# Patient Record
Sex: MALE | Race: WHITE | NOT HISPANIC OR LATINO | Employment: FULL TIME | URBAN - METROPOLITAN AREA
[De-identification: names, ages, dates, MRNs, and addresses within clinical notes are randomized per-mention and may not be internally consistent; named-entity substitution may affect disease eponyms.]

---

## 2017-12-21 ENCOUNTER — LAB VISIT (OUTPATIENT)
Dept: LAB | Facility: HOSPITAL | Age: 42
End: 2017-12-21
Attending: FAMILY MEDICINE
Payer: COMMERCIAL

## 2017-12-21 ENCOUNTER — DOCUMENTATION ONLY (OUTPATIENT)
Dept: FAMILY MEDICINE | Facility: CLINIC | Age: 42
End: 2017-12-21

## 2017-12-21 ENCOUNTER — OFFICE VISIT (OUTPATIENT)
Dept: FAMILY MEDICINE | Facility: CLINIC | Age: 42
End: 2017-12-21
Payer: COMMERCIAL

## 2017-12-21 VITALS
WEIGHT: 255.75 LBS | HEIGHT: 71 IN | BODY MASS INDEX: 35.81 KG/M2 | DIASTOLIC BLOOD PRESSURE: 76 MMHG | SYSTOLIC BLOOD PRESSURE: 122 MMHG

## 2017-12-21 DIAGNOSIS — Z00.00 WELLNESS EXAMINATION: ICD-10-CM

## 2017-12-21 DIAGNOSIS — F41.9 ANXIETY: ICD-10-CM

## 2017-12-21 DIAGNOSIS — Z72.0 TOBACCO USE: ICD-10-CM

## 2017-12-21 DIAGNOSIS — I10 BENIGN ESSENTIAL HTN: ICD-10-CM

## 2017-12-21 DIAGNOSIS — F11.21 OPIOID DEPENDENCE IN REMISSION: ICD-10-CM

## 2017-12-21 DIAGNOSIS — Z76.89 ESTABLISHING CARE WITH NEW DOCTOR, ENCOUNTER FOR: Primary | ICD-10-CM

## 2017-12-21 LAB
ALBUMIN SERPL BCP-MCNC: 4 G/DL
ALP SERPL-CCNC: 67 U/L
ALT SERPL W/O P-5'-P-CCNC: 27 U/L
ANION GAP SERPL CALC-SCNC: 7 MMOL/L
AST SERPL-CCNC: 27 U/L
BILIRUB SERPL-MCNC: 0.3 MG/DL
BUN SERPL-MCNC: 9 MG/DL
CALCIUM SERPL-MCNC: 9.4 MG/DL
CHLORIDE SERPL-SCNC: 102 MMOL/L
CO2 SERPL-SCNC: 30 MMOL/L
CREAT SERPL-MCNC: 1.1 MG/DL
EST. GFR  (AFRICAN AMERICAN): >60 ML/MIN/1.73 M^2
EST. GFR  (NON AFRICAN AMERICAN): >60 ML/MIN/1.73 M^2
GLUCOSE SERPL-MCNC: 80 MG/DL
POTASSIUM SERPL-SCNC: 3.9 MMOL/L
PROT SERPL-MCNC: 7.3 G/DL
SODIUM SERPL-SCNC: 139 MMOL/L
TSH SERPL DL<=0.005 MIU/L-ACNC: 1.79 UIU/ML

## 2017-12-21 PROCEDURE — 80053 COMPREHEN METABOLIC PANEL: CPT

## 2017-12-21 PROCEDURE — 99386 PREV VISIT NEW AGE 40-64: CPT | Mod: S$GLB,,, | Performed by: FAMILY MEDICINE

## 2017-12-21 PROCEDURE — 84443 ASSAY THYROID STIM HORMONE: CPT

## 2017-12-21 PROCEDURE — 36415 COLL VENOUS BLD VENIPUNCTURE: CPT | Mod: PO

## 2017-12-21 PROCEDURE — 99999 PR PBB SHADOW E&M-NEW PATIENT-LVL II: CPT | Mod: PBBFAC,,, | Performed by: FAMILY MEDICINE

## 2017-12-21 RX ORDER — CLONAZEPAM 1 MG/1
TABLET ORAL
COMMUNITY
Start: 2017-09-19 | End: 2017-12-21

## 2017-12-21 RX ORDER — DIAZEPAM 5 MG/1
5 TABLET ORAL EVERY 6 HOURS PRN
Qty: 270 TABLET | Refills: 0 | Status: SHIPPED | OUTPATIENT
Start: 2017-12-21 | End: 2017-12-27 | Stop reason: SDUPTHER

## 2017-12-21 RX ORDER — LISINOPRIL 20 MG/1
20 TABLET ORAL DAILY
COMMUNITY

## 2017-12-21 RX ORDER — BUPRENORPHINE HYDROCHLORIDE, NALOXONE HYDROCHLORIDE 8; 2 MG/1; MG/1
FILM, SOLUBLE BUCCAL; SUBLINGUAL
COMMUNITY
Start: 2017-12-02

## 2017-12-21 NOTE — PROGRESS NOTES
Subjective:       Patient ID: Leroy Pham is a 41 y.o. male.    Chief Complaint: Establish Care    HPI     Establish Care  Pt reports to the clinic to establish care.   The pt has a medical history which includes anxiety and HTN.  As far as smoking is concerned, the pt is in the process of quitting cigs.   The pt attempts to maintain a healthy diet and engages in regular exercise.   Consistent seatbelt usage reported.   Pt has no symptoms of depression.   Health maintenance addressed and updated in chart.    Review of Systems   Constitutional: Negative for chills and fever.   Respiratory: Negative for chest tightness and shortness of breath.    Cardiovascular: Negative for chest pain and leg swelling.   Gastrointestinal: Negative for abdominal pain, constipation, diarrhea and vomiting.   Genitourinary: Negative for decreased urine volume, dysuria and hematuria.   Musculoskeletal: Positive for arthralgias and back pain.   Neurological: Negative for weakness and light-headedness.   Psychiatric/Behavioral: Negative for behavioral problems. The patient is nervous/anxious.        Objective:      Physical Exam   Constitutional: He appears well-developed and well-nourished. No distress.   Obese male in no acute distress.   HENT:   Head: Normocephalic and atraumatic.   Mouth/Throat: Oropharynx is clear and moist. No oropharyngeal exudate.   Eyes: EOM are normal. Pupils are equal, round, and reactive to light.   Neck: Normal range of motion. Neck supple. No thyromegaly present.   Cardiovascular: Normal rate, regular rhythm, normal heart sounds and intact distal pulses.    Pulmonary/Chest: Effort normal and breath sounds normal. No respiratory distress. He has no wheezes.   Abdominal: Soft. Bowel sounds are normal. He exhibits no distension and no mass. There is no tenderness.   Musculoskeletal: He exhibits no edema.   Lymphadenopathy:     He has no cervical adenopathy.   Neurological: He is alert.   Skin: Skin is  warm. No erythema.   Psychiatric: He has a normal mood and affect. His behavior is normal.   Vitals reviewed.      Assessment:       1. Establishing care with new doctor, encounter for    2. Wellness examination    3. Benign essential HTN    4. Anxiety    5. Opioid dependence in remission    6. Tobacco use        Plan:       1. Establishing care with new doctor, encounter for    2. Wellness examination  Patient has been advised to continue to maintain a healthy lifestyle, including regular exercise and consuming a well balanced diet.   - Comprehensive metabolic panel; Future  - Lipid panel; Future  - Microalbumin/creatinine urine ratio; Future    3. Benign essential HTN  Condition currently stable. No changes to medication regimen on today.   - lisinopril (PRINIVIL,ZESTRIL) 20 MG tablet; Take 20 mg by mouth once daily.  - Comprehensive metabolic panel; Future  - Microalbumin/creatinine urine ratio; Future    4. Anxiety  - TSH; Future  - Toxicology screen, urine; Future  - diazePAM (VALIUM) 5 MG tablet; Take 1 tablet (5 mg total) by mouth every 6 (six) hours as needed for Anxiety.  Dispense: 270 tablet; Refill: 0    5. Opioid dependence in remission  F/u Dr. Marshall with [facility].   - SUBOXONE 8-2 mg Film;     6. Tobacco use  Pt is in the process of smoke cessation.   He does not desire therapy as adjunct at this time.     Patient readiness: acceptance and barriers:none    During the course of the visit the patient was educated and counseled about the following:     Hypertension:   Dietary sodium restriction.  Regular aerobic exercise.  Obesity:   Informal exercise measures discussed, e.g. taking stairs instead of elevator.  Regular aerobic exercise program discussed.    Goals: Hypertension: Reduce Blood Pressure and Obesity: Reduce calorie intake and BMI    Did patient meet goals/outcomes: No    The following self management tools provided: blood pressure log  excercise log    Patient Instructions  (the written plan) was given to the patient/family.     Time spent with patient: 15 minutes    Portions of this note were created using Dragon voice recognition software. There may be voice recognition errors found in the text, and attempts were made to correct these errors prior to signature    Qamar Pang MD    Family Medicine  12/21/2017

## 2017-12-21 NOTE — PROGRESS NOTES
Pre-Visit Chart Review  For Appointment Scheduled on (12/21    Health Maintenance Due   Topic Date Due    Lipid Panel  1974    TETANUS VACCINE  12/24/1992    Influenza Vaccine  08/01/2017

## 2017-12-26 ENCOUNTER — TELEPHONE (OUTPATIENT)
Dept: FAMILY MEDICINE | Facility: CLINIC | Age: 42
End: 2017-12-26

## 2017-12-26 DIAGNOSIS — F41.9 ANXIETY: ICD-10-CM

## 2017-12-26 NOTE — TELEPHONE ENCOUNTER
Spoke with patient. Pt. Established care 12/21/17. Prescribed valium 5mg. Pt. States medication isnt working if Rx can be increased to 10mg.    Please Advise:    Informed pt. You will return to office 12/28.

## 2017-12-26 NOTE — TELEPHONE ENCOUNTER
----- Message from Thi Rocha sent at 12/26/2017 12:02 PM CST -----  Contact: self   Patient want to know if you can call in 10mg diazePAM the 5mg not working for patient please send to [facility], any questions please call back at [phone] (home)     [facility]/pharmacy #[id number] - [place] - [address]  [address] [place] [address]  Phone: [phone] Fax: [phone]

## 2017-12-27 ENCOUNTER — TELEPHONE (OUTPATIENT)
Dept: FAMILY MEDICINE | Facility: CLINIC | Age: 42
End: 2017-12-27

## 2017-12-27 RX ORDER — DIAZEPAM 10 MG/1
10 TABLET ORAL EVERY 6 HOURS PRN
Qty: 60 TABLET | Refills: 0 | Status: SHIPPED | OUTPATIENT
Start: 2017-12-27 | End: 2018-01-08 | Stop reason: SDUPTHER

## 2017-12-27 NOTE — TELEPHONE ENCOUNTER
----- Message from Michelle Torres sent at 12/27/2017  3:36 PM CST -----  Contact: patient  Place call to pod,Patient returning call.please call back at [phone]. Thanks,

## 2017-12-30 ENCOUNTER — LAB VISIT (OUTPATIENT)
Dept: LAB | Facility: HOSPITAL | Age: 42
End: 2017-12-30
Attending: FAMILY MEDICINE
Payer: COMMERCIAL

## 2017-12-30 DIAGNOSIS — Z00.00 WELLNESS EXAMINATION: ICD-10-CM

## 2017-12-30 LAB
CHOLEST SERPL-MCNC: 239 MG/DL
CHOLEST/HDLC SERPL: 6.1 {RATIO}
HDLC SERPL-MCNC: 39 MG/DL
HDLC SERPL: 16.3 %
LDLC SERPL CALC-MCNC: 140.4 MG/DL
NONHDLC SERPL-MCNC: 200 MG/DL
TRIGL SERPL-MCNC: 298 MG/DL

## 2017-12-30 PROCEDURE — 80061 LIPID PANEL: CPT

## 2017-12-30 PROCEDURE — 36415 COLL VENOUS BLD VENIPUNCTURE: CPT | Mod: PO

## 2018-01-02 PROBLEM — E78.5 HYPERLIPIDEMIA: Status: ACTIVE | Noted: 2018-01-02

## 2018-01-05 DIAGNOSIS — E78.5 HYPERLIPIDEMIA, UNSPECIFIED HYPERLIPIDEMIA TYPE: Primary | ICD-10-CM

## 2018-01-05 RX ORDER — ATORVASTATIN CALCIUM 40 MG/1
40 TABLET, FILM COATED ORAL DAILY
Qty: 90 TABLET | Refills: 3 | Status: SHIPPED | OUTPATIENT
Start: 2018-01-05 | End: 2019-01-05

## 2018-01-06 ENCOUNTER — NURSE TRIAGE (OUTPATIENT)
Dept: ADMINISTRATIVE | Facility: CLINIC | Age: 43
End: 2018-01-06

## 2018-01-06 NOTE — TELEPHONE ENCOUNTER
Left message on voicemail    Reason for Disposition   Message left on identified answering machine.    Protocols used: ST NO CONTACT OR DUPLICATE CONTACT CALL-A-AH

## 2018-01-06 NOTE — TELEPHONE ENCOUNTER
"Patient stated that he needs a medication refill for Valium. Informed the controlled substances can't be filled after hours per protocol and he verbalized understanding. Will send a message to PCP's office for reopening on Monday. Instructed to call back with any additional questions and/or concerns    Reason for Disposition   Caller has medication question only, adult not sick, and triager answers question    Answer Assessment - Initial Assessment Questions  1. REASON FOR CALL or QUESTION: "What is your reason for calling today?" or "How can I best help you?" or "What question do you have that I can help answer?"      Medication refill    Protocols used: ST MEDICATION QUESTION CALL-A-AH, ST INFORMATION ONLY CALL-A-      "

## 2018-01-08 DIAGNOSIS — F41.9 ANXIETY: ICD-10-CM

## 2018-01-08 RX ORDER — DIAZEPAM 10 MG/1
10 TABLET ORAL EVERY 6 HOURS PRN
Qty: 60 TABLET | Refills: 0 | Status: SHIPPED | OUTPATIENT
Start: 2018-01-08 | End: 2018-01-11 | Stop reason: SDUPTHER

## 2018-01-08 RX ORDER — HYDROCODONE BITARTRATE AND ACETAMINOPHEN 5; 325 MG/1; MG/1
1 TABLET ORAL
Refills: 0 | COMMUNITY
Start: 2017-12-23

## 2018-01-09 ENCOUNTER — TELEPHONE (OUTPATIENT)
Dept: FAMILY MEDICINE | Facility: CLINIC | Age: 43
End: 2018-01-09

## 2018-01-09 NOTE — TELEPHONE ENCOUNTER
"----- Message from Charisse Hay sent at 1/8/2018  4:38 PM CST -----  Contact: Patient  Leroy, patient [phone] calling because he is having issues refilling his prescription at the pharmacy for diazePAM (VALIUM) 10 MG Tab, patient states that he was taking diazePAM 5 Mg, and it was not helping, so it was changed to 10 MG and pharmacy told patient that office needs to call to "override something". If possible, he would like it sent to [facility]. Patient almost out of medication. Please advise. Thanks.    [facility]/pharmacy #[id number] [address] [place] [address]  Phone: [phone] Fax: [phone]      [facility]/pharmacy #[id number] - [place] - [address]  [address] [address]  Phone: [phone] Fax: [phone]    [facility]  [address] [address]  Phone: [phone] Fax: [phone]    "

## 2018-01-09 NOTE — TELEPHONE ENCOUNTER
----- Message from RT Kulwant sent at 1/9/2018  1:35 PM CST -----  Contact: pt    pt , requesting to check the status of medication refill: Diazepam and medication for cholesterol,  thanks.

## 2018-01-09 NOTE — TELEPHONE ENCOUNTER
----- Message from Giana Lomas sent at 1/9/2018  3:22 PM CST -----  Contact: sawyer with [facility] [phone]  sawyer with [facility] [phone], requesting a call from nurse to clarify diazepam.

## 2018-01-10 ENCOUNTER — TELEPHONE (OUTPATIENT)
Dept: FAMILY MEDICINE | Facility: CLINIC | Age: 43
End: 2018-01-10

## 2018-01-10 NOTE — TELEPHONE ENCOUNTER
----- Message from Kristen Godoy sent at 1/10/2018  9:29 AM CST -----  Patient states he wants to speak to , only, about his medication, that is all the information he would give, [phone] (home)

## 2018-01-10 NOTE — TELEPHONE ENCOUNTER
Rx for diazepam has been cancelled per Dr. Pang request. Patient has scheduled an appointment with PCP. Pharmacy notified. Patient notified.

## 2018-01-10 NOTE — TELEPHONE ENCOUNTER
----- Message from Ann House sent at 1/10/2018  8:28 AM CST -----  Contact:  Liam with [facility]   Liam with [facility] pharmacy [phone] called regarding diazepam for above patient. They are requesting one of the following: they received a too soon to fill due to a dosage increase.  Patient is giving them a hard time and Liam would like to speak to someone regarding this issue. Thanks!

## 2018-01-10 NOTE — TELEPHONE ENCOUNTER
LM for patient stating Dr. Pang is out of office on Wednesdays. Will inform PCP patient would like a telephone call.

## 2018-01-10 NOTE — TELEPHONE ENCOUNTER
----- Message from Mal Rubio sent at 1/10/2018 10:46 AM CST -----  Contact: self   Patient want to speak with a nurse regarding his rx diazePAM (VALIUM) 10 MG Tab, please call back at [phone] (home) patient needs a refill,  pharmacy tried calling this morning but not response.

## 2018-01-10 NOTE — TELEPHONE ENCOUNTER
----- Message from [place]ya Lomas sent at 1/10/2018  9:08 AM CST -----  Contact: [phone]  Patient requesting a refill on diazepam.    Patient will be using   [facility], - [place] - [address]  [address] [address]  Phone: [phone] Fax: [phone]    Please call patient at [phone].     Thanks!

## 2018-01-10 NOTE — TELEPHONE ENCOUNTER
----- Message from Ann House sent at 1/10/2018 11:18 AM CST -----  Contact: self  Patient would like to speak to the nurse regarding his prescription of diazepam. Patient states the pharmacy said it was no problem to fill the prescription if the doctor approved it. Patient states the nurse denied prescription until he is seen tomorrow. Patient only made the appointment because he could not get a response from the pharmacy. Please call patient at [phone]. Thanks! Tried to contact Karma

## 2018-01-10 NOTE — TELEPHONE ENCOUNTER
----- Message from Mal Rubio sent at 1/9/2018  4:52 PM CST -----  Contact: Bill with [facility]   [facility] need to speak with a nurse regarding rx  diazePAM (VALIUM) 10 MG Tab, please call back at [phone]

## 2018-01-11 ENCOUNTER — OFFICE VISIT (OUTPATIENT)
Dept: FAMILY MEDICINE | Facility: CLINIC | Age: 43
End: 2018-01-11
Payer: COMMERCIAL

## 2018-01-11 ENCOUNTER — DOCUMENTATION ONLY (OUTPATIENT)
Dept: FAMILY MEDICINE | Facility: CLINIC | Age: 43
End: 2018-01-11

## 2018-01-11 VITALS
BODY MASS INDEX: 35.71 KG/M2 | HEIGHT: 71 IN | WEIGHT: 255.06 LBS | DIASTOLIC BLOOD PRESSURE: 62 MMHG | SYSTOLIC BLOOD PRESSURE: 118 MMHG

## 2018-01-11 DIAGNOSIS — F41.9 ANXIETY: Primary | ICD-10-CM

## 2018-01-11 DIAGNOSIS — I10 BENIGN ESSENTIAL HTN: ICD-10-CM

## 2018-01-11 DIAGNOSIS — F11.21 OPIOID DEPENDENCE IN REMISSION: ICD-10-CM

## 2018-01-11 PROCEDURE — 99999 PR PBB SHADOW E&M-EST. PATIENT-LVL III: CPT | Mod: PBBFAC,,, | Performed by: FAMILY MEDICINE

## 2018-01-11 PROCEDURE — 99213 OFFICE O/P EST LOW 20 MIN: CPT | Mod: S$GLB,,, | Performed by: FAMILY MEDICINE

## 2018-01-11 RX ORDER — DIAZEPAM 10 MG/1
10 TABLET ORAL EVERY 6 HOURS PRN
Qty: 20 TABLET | Refills: 0 | Status: SHIPPED | OUTPATIENT
Start: 2018-01-11 | End: 2018-01-20 | Stop reason: SDUPTHER

## 2018-01-11 NOTE — PROGRESS NOTES
Pre-Visit Chart Review  For Appointment Scheduled on (1/11/18    Health Maintenance Due   Topic Date Due    TETANUS VACCINE  12/24/1992    Pneumococcal PPSV23 (Medium Risk) (1) 12/24/1992    Influenza Vaccine  08/01/2017

## 2018-01-11 NOTE — PROGRESS NOTES
Subjective:       Patient ID: Leroy Pham is a 42 y.o. male.    Chief Complaint: Medication review    HPI     Anxiety  Patient is here to follow-up for anxiety.  He is currently taking Valium for this condition.  The patient has had issues in the past with dose adjustments and states that when he was on the 5 mg dosage of this medication, he did not see much improvement in his symptoms.  Patient has recently been increased to 10 mg dosage on this medication and states that this dosage is effective.  He does report that he is taking his Valium every 6 hours, no matter if his symptoms are present or not.  Patient is not currently being evaluated by psychiatry for this condition.    Review of Systems   Constitutional: Negative for chills and fever.   Respiratory: Negative for chest tightness and shortness of breath.    Cardiovascular: Negative for chest pain and leg swelling.   Gastrointestinal: Negative for abdominal pain, constipation, diarrhea and vomiting.   Genitourinary: Negative for decreased urine volume, dysuria and hematuria.   Musculoskeletal: Negative for arthralgias.   Neurological: Negative for weakness and light-headedness.   Psychiatric/Behavioral: Negative for self-injury. The patient is nervous/anxious.        Objective:      Physical Exam   Constitutional: He appears well-developed and well-nourished. No distress.   Obese male in no acute distress.   HENT:   Head: Normocephalic and atraumatic.   Mouth/Throat: Oropharynx is clear and moist. No oropharyngeal exudate.   Eyes: EOM are normal. Pupils are equal, round, and reactive to light.   Neck: Normal range of motion. Neck supple. No thyromegaly present.   Cardiovascular: Normal rate, regular rhythm, normal heart sounds and intact distal pulses.    Pulmonary/Chest: Effort normal and breath sounds normal. No respiratory distress. He has no wheezes.   Abdominal: Soft. Bowel sounds are normal. He exhibits no distension and no mass. There is no  tenderness.   Musculoskeletal: He exhibits no edema.   Lymphadenopathy:     He has no cervical adenopathy.   Neurological: He is alert.   Skin: Skin is warm. No rash noted. No erythema.   Psychiatric: He has a normal mood and affect. His behavior is normal.   Vitals reviewed.      Assessment:       1. Anxiety    2. Opioid dependence in remission    3. Benign essential HTN        Plan:       1. Anxiety  Patient has been advised that he will only need to take this medication, as needed.  The patient will be referred to psychiatry for further evaluation and management of this condition.  He has violated pain contract with asking for refills on medications prior to scheduled refill but he feels that it was due to his misunderstanding that he should not be taking the Valium every 6 hours.  - diazePAM (VALIUM) 10 MG Tab; Take 1 tablet (10 mg total) by mouth every 6 (six) hours as needed.  Dispense: 20 tablet; Refill: 0  - Ambulatory referral to Psychiatry    2. Opioid dependence in remission  Patient will not be prescribed opioid medications on today.  - Ambulatory referral to Psychiatry    3. Benign essential HTN  Condition currently stable. No changes to medication regimen on today.     Patient readiness: acceptance and barriers:none    During the course of the visit the patient was educated and counseled about the following:     Hypertension:   Dietary sodium restriction.  Regular aerobic exercise.  Obesity:   Informal exercise measures discussed, e.g. taking stairs instead of elevator.  Regular aerobic exercise program discussed.    Goals: Hypertension: Reduce Blood Pressure and Obesity: Reduce calorie intake and BMI    Did patient meet goals/outcomes: No    The following self management tools provided: blood pressure log  excercise log    Patient Instructions (the written plan) was given to the patient/family.     Time spent with patient: 15 minutes    Portions of this note were created using Dragon voice recognition  software. There may be voice recognition errors found in the text, and attempts were made to correct these errors prior to signature    Qamar Pang MD    Family Medicine  1/11/2018

## 2018-01-20 ENCOUNTER — OFFICE VISIT (OUTPATIENT)
Dept: FAMILY MEDICINE | Facility: CLINIC | Age: 43
End: 2018-01-20
Payer: COMMERCIAL

## 2018-01-20 VITALS
SYSTOLIC BLOOD PRESSURE: 130 MMHG | BODY MASS INDEX: 35.62 KG/M2 | HEIGHT: 71 IN | DIASTOLIC BLOOD PRESSURE: 76 MMHG | WEIGHT: 254.44 LBS

## 2018-01-20 DIAGNOSIS — I10 BENIGN ESSENTIAL HTN: ICD-10-CM

## 2018-01-20 DIAGNOSIS — F41.9 ANXIETY: Primary | ICD-10-CM

## 2018-01-20 DIAGNOSIS — Z72.0 TOBACCO USE: ICD-10-CM

## 2018-01-20 DIAGNOSIS — E78.5 HYPERLIPIDEMIA, UNSPECIFIED HYPERLIPIDEMIA TYPE: ICD-10-CM

## 2018-01-20 PROCEDURE — 99213 OFFICE O/P EST LOW 20 MIN: CPT | Mod: S$GLB,,, | Performed by: FAMILY MEDICINE

## 2018-01-20 PROCEDURE — 99999 PR PBB SHADOW E&M-EST. PATIENT-LVL III: CPT | Mod: PBBFAC,,, | Performed by: FAMILY MEDICINE

## 2018-01-20 RX ORDER — DIAZEPAM 10 MG/1
10 TABLET ORAL EVERY 6 HOURS PRN
Qty: 20 TABLET | Refills: 0 | Status: SHIPPED | OUTPATIENT
Start: 2018-01-20 | End: 2018-01-29 | Stop reason: SDUPTHER

## 2018-01-20 NOTE — PATIENT INSTRUCTIONS

## 2018-01-20 NOTE — PROGRESS NOTES
Subjective:       Patient ID: Leroy Pham is a 42 y.o. male.    Chief Complaint: Follow-up    HPI     Follow up  Pt is here to follow up multiple chronic medical conditions.   Pt does reports compliance with medications.   The pt has a current PMH of HLD and HTN.   As it relates to exercise, the pt reports that he has not been routinely exercising.   As far as smoking is concerned, the pt smokes.   Pt has been making attempts at eating healthy.  Health maintenance addressed and updated in chart.    Review of Systems   Constitutional: Negative for chills and fever.   Respiratory: Negative for chest tightness and shortness of breath.    Cardiovascular: Negative for chest pain and leg swelling.   Gastrointestinal: Negative for abdominal pain, constipation, diarrhea and vomiting.   Genitourinary: Negative for decreased urine volume, dysuria and hematuria.   Musculoskeletal: Negative for arthralgias and back pain.   Neurological: Negative for weakness and light-headedness.       Objective:      Physical Exam   Constitutional: He appears well-developed and well-nourished. No distress.   Obese male in no acute distress.     HENT:   Head: Normocephalic and atraumatic.   Mouth/Throat: Oropharynx is clear and moist. No oropharyngeal exudate.   Eyes: EOM are normal. Pupils are equal, round, and reactive to light.   Neck: Normal range of motion. Neck supple. No thyromegaly present.   Cardiovascular: Normal rate, regular rhythm, normal heart sounds and intact distal pulses.    Pulmonary/Chest: Effort normal and breath sounds normal. No respiratory distress. He has no wheezes.   Abdominal: Soft. Bowel sounds are normal. He exhibits no distension and no mass. There is no tenderness.   Musculoskeletal: He exhibits no edema.   Lymphadenopathy:     He has no cervical adenopathy.   Neurological: He is alert.   Skin: Skin is warm. No rash noted. No erythema.   Psychiatric: He has a normal mood and affect. His behavior is  normal.   Vitals reviewed.      Assessment:       1. Anxiety    2. Benign essential HTN    3. Hyperlipidemia, unspecified hyperlipidemia type    4. Tobacco use        Plan:       1. Anxiety  Condition currently stable. No changes to medication regimen on today.     2. Benign essential HTN  Condition currently stable. No changes to medication regimen on today.     3. Hyperlipidemia, unspecified hyperlipidemia type  The 10-year ASCVD risk score (Graciela ULLOA Jr., et al., 2013) is: 10.6%    Values used to calculate the score:      Age: 42 years      Sex: Male      Is Non- : No      Diabetic: No      Tobacco smoker: Yes      Systolic Blood Pressure: 130 mmHg      Is BP treated: Yes      HDL Cholesterol: 39 mg/dL      Total Cholesterol: 239 mg/dL  Continue lipitor.     4. Tobacco use  - Ambulatory referral to Smoking Cessation Program    5. BMI 35.0-35.9,adult  Patient has been advised to continue to maintain a healthy lifestyle, including regular exercise and consuming a well balanced diet.     Patient readiness: acceptance and barriers:none    During the course of the visit the patient was educated and counseled about the following:     Hypertension:   Dietary sodium restriction.  Regular aerobic exercise.  Obesity:   Informal exercise measures discussed, e.g. taking stairs instead of elevator.  Regular aerobic exercise program discussed.    Goals: Hypertension: Reduce Blood Pressure and Obesity: Reduce calorie intake and BMI    Did patient meet goals/outcomes: No    The following self management tools provided: blood pressure log  excercise log    Patient Instructions (the written plan) was given to the patient/family.     Time spent with patient: 15 minutes    Portions of this note were created using Dragon voice recognition software. There may be voice recognition errors found in the text, and attempts were made to correct these errors prior to signature    Qamar Pang MD    Family  Medicine  1/20/2018

## 2018-01-29 ENCOUNTER — NURSE TRIAGE (OUTPATIENT)
Dept: ADMINISTRATIVE | Facility: CLINIC | Age: 43
End: 2018-01-29

## 2018-01-29 DIAGNOSIS — F41.9 ANXIETY: ICD-10-CM

## 2018-01-29 NOTE — TELEPHONE ENCOUNTER
Reason for Disposition   Caller has NON-URGENT medication question about med that PCP prescribed and triager unable to answer question    Protocols used: ST MEDICATION QUESTION CALL-A-AH    Patient is requesting a refill of Valium. Patient states that he has an appointment on 3/29/18 with psychiatry. Patient would like refill until he can get in to see psychiatry. Can you please advise patient?

## 2018-01-30 ENCOUNTER — TELEPHONE (OUTPATIENT)
Dept: FAMILY MEDICINE | Facility: CLINIC | Age: 43
End: 2018-01-30

## 2018-01-30 RX ORDER — DIAZEPAM 10 MG/1
TABLET ORAL
Qty: 20 TABLET | Refills: 0 | Status: SHIPPED | OUTPATIENT
Start: 2018-01-30 | End: 2018-02-19 | Stop reason: SDUPTHER

## 2018-01-30 NOTE — TELEPHONE ENCOUNTER
Patient informed refill for Valium 10mg was sent to [facility].  Instructed to take one po daily PRN. Patient was informed to keep psych appointment for further refills.  Voiced understanding.

## 2018-01-30 NOTE — TELEPHONE ENCOUNTER
Attempted to contact patient regarding request for Valium 10mg refill.  Rx escribed to pharmacy. Patient given #20.  Needs to instruct to take daily as needed.  Rx given until psych. Appointment.

## 2018-01-30 NOTE — TELEPHONE ENCOUNTER
----- Message from Elen Ramos sent at 1/29/2018  3:29 PM CST -----  Contact: patient  Patient calling to request a refill for Diazepam. He is out of medication. He is having anxiety attacks.  He can't get in to see the Psychiatrist until 3/29/18. Please advise.   Call back   Thanks!  [facility] [place] - [address]  [address]  [facility]  [place] [address]  Phone: [phone] Fax: [phone]

## 2018-02-17 ENCOUNTER — NURSE TRIAGE (OUTPATIENT)
Dept: ADMINISTRATIVE | Facility: CLINIC | Age: 43
End: 2018-02-17

## 2018-02-17 NOTE — TELEPHONE ENCOUNTER
Patient requested a Valium refill. Informed him that I would send a message to the provider's office for when the office reopens on Monday. Instructed to call back with any additional problems and/or concerns    Reason for Disposition   Caller requesting a NON-URGENT new prescription or refill and triager unable to refill per unit policy    Protocols used: ST MEDICATION QUESTION CALL-A-AH

## 2018-02-19 DIAGNOSIS — F41.9 ANXIETY: ICD-10-CM

## 2018-02-20 ENCOUNTER — TELEPHONE (OUTPATIENT)
Dept: FAMILY MEDICINE | Facility: CLINIC | Age: 43
End: 2018-02-20

## 2018-02-20 RX ORDER — DIAZEPAM 10 MG/1
TABLET ORAL
Qty: 20 TABLET | Refills: 0 | Status: SHIPPED | OUTPATIENT
Start: 2018-02-20 | End: 2018-03-05 | Stop reason: SDUPTHER

## 2018-02-20 NOTE — TELEPHONE ENCOUNTER
Spoke to pt on separate encounter. Pt requested refill of medication.     ----- Message from Cielo Moreira sent at 2/19/2018  4:50 PM CST -----  Contact: self  Patient missed a call from office   Please call back [phone]

## 2018-02-20 NOTE — TELEPHONE ENCOUNTER
Called pt regarding below message. Informed pt that prescription refill was submitted today. Confirmed pharmacy location. Pt verbalized understanding with no further questions.     ----- Message from Marques Menchaca sent at 2/19/2018  4:18 PM CST -----  Contact: Patient  Leroy, [phone], Returning nurse's call regarding refill for diazePAM (VALIUM) 10 MG Tab. Please advise. Thanks.

## 2018-03-05 ENCOUNTER — TELEPHONE (OUTPATIENT)
Dept: FAMILY MEDICINE | Facility: CLINIC | Age: 43
End: 2018-03-05

## 2018-03-05 DIAGNOSIS — E78.5 HYPERLIPIDEMIA, UNSPECIFIED HYPERLIPIDEMIA TYPE: ICD-10-CM

## 2018-03-05 DIAGNOSIS — F41.9 ANXIETY: ICD-10-CM

## 2018-03-05 RX ORDER — DIAZEPAM 10 MG/1
10 TABLET ORAL EVERY 6 HOURS PRN
Qty: 20 TABLET | Refills: 0 | Status: SHIPPED | OUTPATIENT
Start: 2018-03-05 | End: 2018-03-06 | Stop reason: SDUPTHER

## 2018-03-05 RX ORDER — AMOXICILLIN 500 MG/1
CAPSULE ORAL
COMMUNITY
Start: 2017-12-23

## 2018-03-05 NOTE — TELEPHONE ENCOUNTER
Called pt regarding medication refill request. Left voicemail with return number.     ----- Message from Yvette Reeder sent at 3/5/2018 12:08 PM CST -----  Contact: self  Returning missed call. Please call back [phone]

## 2018-03-05 NOTE — TELEPHONE ENCOUNTER
Called pt regarding below message. Left voicemail with return number.     ----- Message from Odalys Candelaria sent at 3/5/2018 11:06 AM CST -----  Contact: Patient  Patient is requesting a refill on his cholesterol medication too.  Patient is out of this medication.  Call Back#[phone]  Thanks     [facility] [facility] [place] [facility] [address]  [address] [facility] [address]  Phone: [phone] Fax: [phone]

## 2018-03-05 NOTE — TELEPHONE ENCOUNTER
Pt requesting medication refill on Diazepam 10 mg  Last refill: 2/20/18  Last visit: 1/20/18  Follow Up: no follow up scheduled           ----- Message from Elen Ramos sent at 3/5/2018  8:53 AM CST -----  Contact: patient  Type:  RX Refill Request    Who Called: Patient  Refill or New Rx:  Refill  RX Name and Strength: diazePAM (VALIUM) 10 MG Tab  How is the patient currently taking it? (ex. 1XDay):  take 1 tablet by mouth every 6 hours IF NEEDED  Is this a 30 day or 90 day RX:  20  Preferred Pharmacy with phone number:  [facility], but there is 2 listed in record  Local or Mail Order:  Local  Ordering Provider: Dr Qamar Pang  Best Call Back Number: [phone]  Additional Information:  N/A

## 2018-03-06 DIAGNOSIS — F41.9 ANXIETY: ICD-10-CM

## 2018-03-06 RX ORDER — DIAZEPAM 10 MG/1
TABLET ORAL
Qty: 20 TABLET | Refills: 0 | Status: SHIPPED | OUTPATIENT
Start: 2018-03-06